# Patient Record
Sex: MALE | Race: WHITE | URBAN - METROPOLITAN AREA
[De-identification: names, ages, dates, MRNs, and addresses within clinical notes are randomized per-mention and may not be internally consistent; named-entity substitution may affect disease eponyms.]

---

## 2021-12-14 ENCOUNTER — APPOINTMENT (RX ONLY)
Dept: URBAN - METROPOLITAN AREA CLINIC 49 | Facility: CLINIC | Age: 56
Setting detail: DERMATOLOGY
End: 2021-12-14

## 2021-12-14 VITALS — HEIGHT: 71 IN | WEIGHT: 164 LBS

## 2021-12-14 DIAGNOSIS — L60.3 NAIL DYSTROPHY: ICD-10-CM

## 2021-12-14 PROCEDURE — ? ORDER TESTS

## 2021-12-14 PROCEDURE — 99203 OFFICE O/P NEW LOW 30 MIN: CPT

## 2021-12-14 PROCEDURE — ? PRESCRIPTION

## 2021-12-14 PROCEDURE — ? PHOTO-DOCUMENTATION

## 2021-12-14 PROCEDURE — ? PRESCRIPTION MEDICATION MANAGEMENT

## 2021-12-14 PROCEDURE — ? COUNSELING

## 2021-12-14 PROCEDURE — ? MEDICATION COUNSELING

## 2021-12-14 RX ORDER — DOXYCYCLINE HYCLATE 100 MG/1
CAPSULE, GELATIN COATED ORAL
Qty: 28 | Refills: 0 | Status: ERX | COMMUNITY
Start: 2021-12-14

## 2021-12-14 RX ADMIN — DOXYCYCLINE HYCLATE: 100 CAPSULE, GELATIN COATED ORAL at 00:00

## 2021-12-14 ASSESSMENT — LOCATION SIMPLE DESCRIPTION DERM
LOCATION SIMPLE: LEFT INDEX FINGER
LOCATION SIMPLE: RIGHT HAND
LOCATION SIMPLE: LEFT RING FINGER
LOCATION SIMPLE: LEFT MIDDLE FINGER
LOCATION SIMPLE: LEFT THUMB

## 2021-12-14 ASSESSMENT — LOCATION ZONE DERM: LOCATION ZONE: FINGERNAIL

## 2021-12-14 ASSESSMENT — LOCATION DETAILED DESCRIPTION DERM
LOCATION DETAILED: LEFT MIDDLE FINGERNAIL
LOCATION DETAILED: LEFT THUMBNAIL
LOCATION DETAILED: LEFT INDEX FINGERNAIL
LOCATION DETAILED: LEFT RING FINGERNAIL
LOCATION DETAILED: RIGHT THUMBNAIL

## 2021-12-14 NOTE — PROCEDURE: ORDER TESTS
Performing Laboratory: -[id number]
Bill For Surgical Tray: no
Lab Facility: 0
Expected Date Of Service: 12/14/2021
Billing Type: Third-Party Bill

## 2021-12-14 NOTE — PROCEDURE: MEDICATION COUNSELING
Xelmendozaz Pregnancy And Lactation Text: This medication is Pregnancy Category D and is not considered safe during pregnancy.  The risk during breast feeding is also uncertain.

## 2021-12-14 NOTE — PROCEDURE: PRESCRIPTION MEDICATION MANAGEMENT
Render In Strict Bullet Format?: No
Detail Level: Zone
Initiate Treatment: doxycycline hyclate 100 mg capsule: Take 1 pill PO BID x14 days. Do not take within 4 hours if multivitamin and ferrous sulfate supplement.\\nOTC soak affected finger nails in white vinegar BID  x15 min x1 month
Plan: Follow-up in 1 month with Dr. Luisito Cortez

## 2022-01-17 ENCOUNTER — APPOINTMENT (RX ONLY)
Dept: URBAN - METROPOLITAN AREA CLINIC 50 | Facility: CLINIC | Age: 57
Setting detail: DERMATOLOGY
End: 2022-01-17

## 2022-01-17 DIAGNOSIS — B35.1 TINEA UNGUIUM: ICD-10-CM | Status: INADEQUATELY CONTROLLED

## 2022-01-17 DIAGNOSIS — L03.01 CELLULITIS OF FINGER: ICD-10-CM | Status: IMPROVED

## 2022-01-17 PROBLEM — L03.012 CELLULITIS OF LEFT FINGER: Status: ACTIVE | Noted: 2022-01-17

## 2022-01-17 PROCEDURE — 99213 OFFICE O/P EST LOW 20 MIN: CPT

## 2022-01-17 PROCEDURE — ? COUNSELING

## 2022-01-17 PROCEDURE — ? NAIL CLIPPING FOR PAS

## 2022-01-17 PROCEDURE — ? PRESCRIPTION

## 2022-01-17 RX ORDER — CICLOPIROX 80 MG/ML
SOLUTION TOPICAL
Qty: 6.6 | Refills: 10 | Status: ERX | COMMUNITY
Start: 2022-01-17

## 2022-01-17 RX ADMIN — CICLOPIROX: 80 SOLUTION TOPICAL at 00:00

## 2022-01-17 ASSESSMENT — LOCATION DETAILED DESCRIPTION DERM
LOCATION DETAILED: LEFT RING FINGER LUNULA
LOCATION DETAILED: LEFT DISTAL DORSAL INDEX FINGER
LOCATION DETAILED: LEFT DISTAL DORSAL INDEX FINGER
LOCATION DETAILED: LEFT MID DORSAL MIDDLE FINGER
LOCATION DETAILED: LEFT DISTAL DORSAL RING FINGER
LOCATION DETAILED: LEFT MIDDLE FINGER LUNULA
LOCATION DETAILED: LEFT INDEX FINGERNAIL
LOCATION DETAILED: LEFT DISTAL DORSAL RING FINGER
LOCATION DETAILED: PERIUNGUAL SKIN LEFT INDEX FINGER
LOCATION DETAILED: LEFT MID DORSAL MIDDLE FINGER

## 2022-01-17 ASSESSMENT — LOCATION SIMPLE DESCRIPTION DERM
LOCATION SIMPLE: LEFT MIDDLE FINGER
LOCATION SIMPLE: LEFT INDEX FINGER
LOCATION SIMPLE: LEFT RING FINGERNAIL
LOCATION SIMPLE: LEFT MIDDLE FINGERNAIL
LOCATION SIMPLE: LEFT RING FINGER
LOCATION SIMPLE: LEFT RING FINGER
LOCATION SIMPLE: LEFT MIDDLE FINGER
LOCATION SIMPLE: LEFT INDEX FINGER

## 2022-01-17 ASSESSMENT — LOCATION ZONE DERM
LOCATION ZONE: FINGERNAIL
LOCATION ZONE: FINGER
LOCATION ZONE: FINGER

## 2022-04-28 ENCOUNTER — APPOINTMENT (RX ONLY)
Dept: URBAN - METROPOLITAN AREA CLINIC 49 | Facility: CLINIC | Age: 57
Setting detail: DERMATOLOGY
End: 2022-04-28

## 2022-04-28 DIAGNOSIS — B35.1 TINEA UNGUIUM: ICD-10-CM | Status: IMPROVED

## 2022-04-28 DIAGNOSIS — L03.01 CELLULITIS OF FINGER: ICD-10-CM | Status: INADEQUATELY CONTROLLED

## 2022-04-28 PROBLEM — L03.011 CELLULITIS OF RIGHT FINGER: Status: ACTIVE | Noted: 2022-04-28

## 2022-04-28 PROCEDURE — ? PRESCRIPTION

## 2022-04-28 PROCEDURE — 99213 OFFICE O/P EST LOW 20 MIN: CPT

## 2022-04-28 PROCEDURE — ? PRESCRIPTION MEDICATION MANAGEMENT

## 2022-04-28 PROCEDURE — ? COUNSELING

## 2022-04-28 RX ORDER — MUPIROCIN 20 MG/G
OINTMENT TOPICAL
Qty: 22 | Refills: 3 | Status: ERX | COMMUNITY
Start: 2022-04-28

## 2022-04-28 RX ADMIN — MUPIROCIN: 20 OINTMENT TOPICAL at 00:00

## 2022-04-28 ASSESSMENT — LOCATION SIMPLE DESCRIPTION DERM
LOCATION SIMPLE: RIGHT MIDDLE FINGER
LOCATION SIMPLE: LEFT INDEX FINGER
LOCATION SIMPLE: RIGHT THUMB
LOCATION SIMPLE: RIGHT INDEX FINGER
LOCATION SIMPLE: LEFT MIDDLE FINGER

## 2022-04-28 ASSESSMENT — LOCATION DETAILED DESCRIPTION DERM
LOCATION DETAILED: LEFT INDEX FINGERNAIL
LOCATION DETAILED: LEFT MIDDLE FINGERNAIL
LOCATION DETAILED: RIGHT INDEX FINGERNAIL
LOCATION DETAILED: RIGHT MIDDLE FINGERNAIL
LOCATION DETAILED: PERIUNGUAL SKIN RIGHT THUMB

## 2022-04-28 ASSESSMENT — LOCATION ZONE DERM
LOCATION ZONE: FINGER
LOCATION ZONE: FINGERNAIL

## 2022-04-28 NOTE — PROCEDURE: PRESCRIPTION MEDICATION MANAGEMENT
Render In Strict Bullet Format?: No
Detail Level: Detailed
Continue Regimen: vinegar sol. soaks followed by ciclopirox 8% application to nails until resolved

## 2024-02-11 NOTE — PROCEDURE: NAIL CLIPPING FOR PAS
Asthma exacerbation
Detail Level: Detailed
Billing Type: Third-Party Bill
Add [id number] To Bill?: No
Lab Facility: 0